# Patient Record
Sex: FEMALE | Race: WHITE | NOT HISPANIC OR LATINO | Employment: OTHER | URBAN - METROPOLITAN AREA
[De-identification: names, ages, dates, MRNs, and addresses within clinical notes are randomized per-mention and may not be internally consistent; named-entity substitution may affect disease eponyms.]

---

## 2017-10-06 NOTE — PATIENT DISCUSSION
Patient stopped alphagan while in the hospital. Even though IOP is better, resume alphagan BID OU  until IOP stable after cataract surgery.

## 2021-07-24 ENCOUNTER — NEW PATIENT COMPREHENSIVE (OUTPATIENT)
Dept: URBAN - METROPOLITAN AREA CLINIC 46 | Facility: CLINIC | Age: 76
End: 2021-07-24

## 2021-07-24 DIAGNOSIS — H04.123: ICD-10-CM

## 2021-07-24 DIAGNOSIS — Z98.890: ICD-10-CM

## 2021-07-24 DIAGNOSIS — H26.493: ICD-10-CM

## 2021-07-24 DIAGNOSIS — H35.3131: ICD-10-CM

## 2021-07-24 DIAGNOSIS — H52.7: ICD-10-CM

## 2021-07-24 PROCEDURE — 92004 COMPRE OPH EXAM NEW PT 1/>: CPT

## 2021-07-24 PROCEDURE — 92015 DETERMINE REFRACTIVE STATE: CPT

## 2021-07-24 ASSESSMENT — VISUAL ACUITY
OS_BAT: >20/400
OD_SC: 20/25-1
OD_BAT: 20/60
OS_PH: 20/40
OD_SC: J6
OS_SC: 20/200
OS_SC: J2

## 2021-07-24 ASSESSMENT — TONOMETRY
OS_IOP_MMHG: 12
OD_IOP_MMHG: 11

## 2021-08-03 ENCOUNTER — YAG EVALUATION (OUTPATIENT)
Dept: URBAN - METROPOLITAN AREA CLINIC 39 | Facility: CLINIC | Age: 76
End: 2021-08-03

## 2021-08-03 ENCOUNTER — SURGERY/PROCEDURE (OUTPATIENT)
Dept: URBAN - METROPOLITAN AREA SURGERY 14 | Facility: SURGERY | Age: 76
End: 2021-08-03

## 2021-08-03 DIAGNOSIS — H26.493: ICD-10-CM

## 2021-08-03 PROCEDURE — 6682150 YAG CAPSULOTOMY

## 2021-08-03 PROCEDURE — 92014 COMPRE OPH EXAM EST PT 1/>: CPT

## 2021-08-03 ASSESSMENT — VISUAL ACUITY
OD_BAT: 20/60
OS_SC: 20/200
OS_BAT: 20/400
OD_SC: 20/25+2
OU_SC: 20/25+2

## 2021-08-03 ASSESSMENT — TONOMETRY
OS_IOP_MMHG: 12
OD_IOP_MMHG: 12

## 2021-08-10 ENCOUNTER — YAG POST-OP (OUTPATIENT)
Dept: URBAN - METROPOLITAN AREA CLINIC 46 | Facility: CLINIC | Age: 76
End: 2021-08-10

## 2021-08-10 DIAGNOSIS — Z98.890: ICD-10-CM

## 2021-08-10 PROCEDURE — 99024 POSTOP FOLLOW-UP VISIT: CPT

## 2021-08-10 ASSESSMENT — TONOMETRY
OD_IOP_MMHG: 13
OS_IOP_MMHG: 11

## 2021-08-10 ASSESSMENT — VISUAL ACUITY
OD_SC: 20/25-1
OS_SC: J1
OS_SC: 20/100
OD_SC: J6

## 2022-08-12 ENCOUNTER — COMPREHENSIVE EXAM (OUTPATIENT)
Dept: URBAN - METROPOLITAN AREA CLINIC 46 | Facility: CLINIC | Age: 77
End: 2022-08-12

## 2022-08-12 DIAGNOSIS — H52.7: ICD-10-CM

## 2022-08-12 DIAGNOSIS — H35.3131: ICD-10-CM

## 2022-08-12 DIAGNOSIS — H04.123: ICD-10-CM

## 2022-08-12 PROCEDURE — 92014 COMPRE OPH EXAM EST PT 1/>: CPT

## 2022-08-12 PROCEDURE — 92134 CPTRZ OPH DX IMG PST SGM RTA: CPT

## 2022-08-12 PROCEDURE — 92015 DETERMINE REFRACTIVE STATE: CPT

## 2022-08-12 ASSESSMENT — VISUAL ACUITY
OD_SC: 20/25
OU_SC: J1
OU_SC: 20/25
OS_SC: 20/100
OD_SC: J12
OS_SC: J1

## 2022-08-12 ASSESSMENT — TONOMETRY
OD_IOP_MMHG: 14
OS_IOP_MMHG: 14

## 2023-05-11 ENCOUNTER — CONSULTATION/EVALUATION (OUTPATIENT)
Dept: URBAN - METROPOLITAN AREA CLINIC 44 | Facility: CLINIC | Age: 78
End: 2023-05-11

## 2023-05-11 DIAGNOSIS — L98.8: ICD-10-CM

## 2023-05-11 PROCEDURE — 99499 UNLISTED E&M SERVICE: CPT

## 2023-06-20 ENCOUNTER — ESTABLISHED PATIENT (OUTPATIENT)
Dept: URBAN - METROPOLITAN AREA CLINIC 39 | Facility: CLINIC | Age: 78
End: 2023-06-20

## 2023-06-20 DIAGNOSIS — L98.8: ICD-10-CM

## 2023-06-20 PROCEDURE — 17999LT LIGHT AND LASER THERAPY

## 2023-07-07 ENCOUNTER — ESTABLISHED PATIENT (OUTPATIENT)
Dept: URBAN - METROPOLITAN AREA CLINIC 39 | Facility: CLINIC | Age: 78
End: 2023-07-07

## 2023-07-07 DIAGNOSIS — L98.8: ICD-10-CM

## 2023-07-07 PROCEDURE — 17999LT LIGHT AND LASER THERAPY

## 2023-08-14 ENCOUNTER — COMPREHENSIVE EXAM (OUTPATIENT)
Dept: URBAN - METROPOLITAN AREA CLINIC 46 | Facility: CLINIC | Age: 78
End: 2023-08-14

## 2023-08-14 DIAGNOSIS — H52.7: ICD-10-CM

## 2023-08-14 DIAGNOSIS — H35.3131: ICD-10-CM

## 2023-08-14 DIAGNOSIS — H04.123: ICD-10-CM

## 2023-08-14 PROCEDURE — 92014 COMPRE OPH EXAM EST PT 1/>: CPT

## 2023-08-14 PROCEDURE — 92134 CPTRZ OPH DX IMG PST SGM RTA: CPT

## 2023-08-14 PROCEDURE — 92015 DETERMINE REFRACTIVE STATE: CPT

## 2023-08-14 ASSESSMENT — VISUAL ACUITY
OS_SC: 20/150
OS_SC: J1
OD_SC: J5
OD_SC: 20/20

## 2023-08-14 ASSESSMENT — TONOMETRY
OS_IOP_MMHG: 12
OD_IOP_MMHG: 14

## 2023-08-18 ENCOUNTER — FOLLOW UP (OUTPATIENT)
Dept: URBAN - METROPOLITAN AREA CLINIC 39 | Facility: CLINIC | Age: 78
End: 2023-08-18

## 2023-08-18 DIAGNOSIS — L98.8: ICD-10-CM

## 2023-08-18 PROCEDURE — 17999LT LIGHT AND LASER THERAPY

## 2024-08-15 ENCOUNTER — COMPREHENSIVE EXAM (OUTPATIENT)
Dept: URBAN - METROPOLITAN AREA CLINIC 46 | Facility: CLINIC | Age: 79
End: 2024-08-15

## 2024-08-15 DIAGNOSIS — Z98.890: ICD-10-CM

## 2024-08-15 DIAGNOSIS — H35.3131: ICD-10-CM

## 2024-08-15 DIAGNOSIS — Z96.1: ICD-10-CM

## 2024-08-15 DIAGNOSIS — H04.123: ICD-10-CM

## 2024-08-15 PROCEDURE — 92014 COMPRE OPH EXAM EST PT 1/>: CPT

## 2024-08-15 PROCEDURE — 92134 CPTRZ OPH DX IMG PST SGM RTA: CPT

## 2024-08-15 ASSESSMENT — VISUAL ACUITY
OS_SC: J1
OD_SC: 20/20-2
OS_PH: 20/25
OU_SC: 20/20
OS_SC: 20/150
OU_SC: J1
OD_SC: J5

## 2024-08-15 ASSESSMENT — TONOMETRY
OD_IOP_MMHG: 13
OS_IOP_MMHG: 12

## 2025-08-18 ENCOUNTER — PREPPED CHART (OUTPATIENT)
Age: 80
End: 2025-08-18